# Patient Record
Sex: MALE
[De-identification: names, ages, dates, MRNs, and addresses within clinical notes are randomized per-mention and may not be internally consistent; named-entity substitution may affect disease eponyms.]

---

## 2017-10-18 ENCOUNTER — APPOINTMENT (OUTPATIENT)
Dept: ENDOCRINOLOGY | Facility: CLINIC | Age: 62
End: 2017-10-18

## 2017-11-15 ENCOUNTER — LABORATORY RESULT (OUTPATIENT)
Age: 62
End: 2017-11-15

## 2017-11-15 ENCOUNTER — APPOINTMENT (OUTPATIENT)
Dept: ENDOCRINOLOGY | Facility: CLINIC | Age: 62
End: 2017-11-15
Payer: COMMERCIAL

## 2017-11-15 DIAGNOSIS — Z78.9 OTHER SPECIFIED HEALTH STATUS: ICD-10-CM

## 2017-11-15 PROCEDURE — 99204 OFFICE O/P NEW MOD 45 MIN: CPT | Mod: 25

## 2017-11-15 PROCEDURE — 82962 GLUCOSE BLOOD TEST: CPT

## 2017-11-22 ENCOUNTER — RX RENEWAL (OUTPATIENT)
Age: 62
End: 2017-11-22

## 2017-11-22 RX ORDER — ERGOCALCIFEROL 1.25 MG/1
1.25 MG CAPSULE, LIQUID FILLED ORAL
Qty: 12 | Refills: 3 | Status: ACTIVE | COMMUNITY
Start: 2017-11-22 | End: 1900-01-01

## 2019-07-24 ENCOUNTER — LABORATORY RESULT (OUTPATIENT)
Age: 64
End: 2019-07-24

## 2019-07-24 ENCOUNTER — APPOINTMENT (OUTPATIENT)
Dept: ENDOCRINOLOGY | Facility: CLINIC | Age: 64
End: 2019-07-24
Payer: COMMERCIAL

## 2019-07-24 VITALS
HEART RATE: 70 BPM | OXYGEN SATURATION: 100 % | DIASTOLIC BLOOD PRESSURE: 88 MMHG | HEIGHT: 66 IN | SYSTOLIC BLOOD PRESSURE: 140 MMHG

## 2019-07-24 DIAGNOSIS — Z82.49 FAMILY HISTORY OF ISCHEMIC HEART DISEASE AND OTHER DISEASES OF THE CIRCULATORY SYSTEM: ICD-10-CM

## 2019-07-24 DIAGNOSIS — Z86.59 PERSONAL HISTORY OF OTHER MENTAL AND BEHAVIORAL DISORDERS: ICD-10-CM

## 2019-07-24 DIAGNOSIS — E78.5 HYPERLIPIDEMIA, UNSPECIFIED: ICD-10-CM

## 2019-07-24 DIAGNOSIS — Z83.3 FAMILY HISTORY OF DIABETES MELLITUS: ICD-10-CM

## 2019-07-24 DIAGNOSIS — Z00.00 ENCOUNTER FOR GENERAL ADULT MEDICAL EXAMINATION W/OUT ABNORMAL FINDINGS: ICD-10-CM

## 2019-07-24 DIAGNOSIS — E11.42 TYPE 2 DIABETES MELLITUS WITH DIABETIC POLYNEUROPATHY: ICD-10-CM

## 2019-07-24 DIAGNOSIS — H40.151: ICD-10-CM

## 2019-07-24 DIAGNOSIS — Z80.6 FAMILY HISTORY OF LEUKEMIA: ICD-10-CM

## 2019-07-24 DIAGNOSIS — Z86.39 PERSONAL HISTORY OF OTHER ENDOCRINE, NUTRITIONAL AND METABOLIC DISEASE: ICD-10-CM

## 2019-07-24 PROCEDURE — 99205 OFFICE O/P NEW HI 60 MIN: CPT

## 2019-07-24 RX ORDER — SEMAGLUTIDE 1.34 MG/ML
2 INJECTION, SOLUTION SUBCUTANEOUS
Qty: 4 | Refills: 8 | Status: ACTIVE | COMMUNITY
Start: 2019-07-24 | End: 1900-01-01

## 2019-07-24 RX ORDER — ATORVASTATIN CALCIUM 20 MG/1
20 TABLET, FILM COATED ORAL
Qty: 90 | Refills: 3 | Status: ACTIVE | COMMUNITY
Start: 2019-07-24 | End: 1900-01-01

## 2019-07-24 RX ORDER — METFORMIN HYDROCHLORIDE 1000 MG/1
1000 TABLET, COATED ORAL
Refills: 0 | Status: ACTIVE | COMMUNITY

## 2019-07-24 NOTE — REASON FOR VISIT
[Initial Eval - Existing Diagnosis] : an initial evaluation of an existing diagnosis [FreeTextEntry1] : Diabetes

## 2019-07-24 NOTE — ASSESSMENT
[Carbohydrate Consistent Diet] : carbohydrate consistent diet [Hypoglycemia Management] : hypoglycemia management [Diabetes Foot Care] : diabetes foot care [Importance of Diet and Exercise] : importance of diet and exercise to improve glycemic control, achieve weight loss and improve cardiovascular health [Action and use of Insulin] : action and use of short and long-acting insulin [Long Term Vascular Complications] : long term vascular complications of diabetes [Self Monitoring of Blood Glucose] : self monitoring of blood glucose [Injection Technique, Storage, Sharps Disposal] : injection technique, storage, and sharps disposal [Insulin Self-Administration] : insulin self-administration [FreeTextEntry1] : 62 year old male present for initial evaluation of existing diagnosis of uncontrolled T2 and hypercalcemia \par Mild hypercalcemia, pt asymptomatic no FM hx of hyperparathyroid \par Pt is high CV risk with hyperglycemia, obesity, HTN, and HLD \par Extensive education done with pt including importance of adhering to diet and exercise\par Advise for pt to eat small frequent meals, not to skip meals and not to give himself insulin if he is not eating \par \par #1 DM\par - reviewed long term effects of uncontrolled DM\par - instructed on BG goals fasting and 2 hours post meals\par - instructed on increasing activity to  30 min a day\par - cont with Metformin 1 gm BID, Lantus at 30 units, decrease Humalog to 10 units BID ( when Ozempic started)\par - Initiate Ozempic Q week.  reviewed side effects with pt including but not limited to GI disturbances, pancreatitis, and thyroid CA.  Pt denies any FM Hx of thyroid Cancer. \par - reviewed how to use pen and self inject/ injection techniques\par - labs ordered today \par - RTC in 2 weeks with log for review and f/u on Ozempic tolerance \par \par #2 HTN \par Cont with current meds \par increase activity \par Avoid salty food or adding salt to meals \par \par #3 HyperCalcemia \par repeat ca level \par Check PTH \par r/o hyperparathyroidism  \par \par #4 Vit D deficiency \par Check levels \par \par #5 HLD \par Initiate statin - Atorvastatin 20 mg \par Decrease fats, eat lean meats \par Increase exercise \par \par Verbalized understanding to instruction\par 2 week F/U [FreeTextEntry2] : Ozempic use and administration

## 2019-07-24 NOTE — HISTORY OF PRESENT ILLNESS
[FreeTextEntry1] : 64 year old male present for initial evaluation of existing diagnosis of T2DM\par History of DM for past ~30 years\par pt reports have never been controlled.  He does not follow diabetic diet and eat whatever he wants.  Although he states with in the last month he has been trying to eat healthier \par Per pts knowledge he has no DM complications \par PMH: HTN, HLD, Glaucoma, anxiety, hypercalcemia \par   \par Denies polydipsia, polyuria, polyphagia, blurry vision or fatigue, CP or SOB \par \par Current regimen: Lantus 30 units daily, Humalog 16 units BID (pt self doses insulin), metformin 1g BID, Amlodipine, Losartan-HCTZ, \par Pt only uses Humalog BID because he skips meals and usually does not have lunch \par \par SMBG: BID \par Range: 120-200\par Fundoscopic examination: 2 weeks ago + Glaucoma \par Podiatrist: >1 year \par exercise: sedentary \par \par \par LABS: March 2019 \par Ca 11.6\par +microalbumin 82 \par \par Tot. Chol 216\par A1C 10.0%

## 2019-07-24 NOTE — PHYSICAL EXAM
[Alert] : alert [Clear to Auscultation] : lungs were clear to auscultation bilaterally [Normal S1, S2] : normal S1 and S2 [Oriented x3] : oriented to person, place, and time

## 2019-07-24 NOTE — CONSULT LETTER
[Dear  ___] : Dear  [unfilled], [Courtesy Letter:] : I had the pleasure of seeing your patient, [unfilled], in my office today. [( Thank you for referring [unfilled] for consultation for _____ )] : Thank you for referring [unfilled] for consultation for [unfilled] [Please see my note below.] : Please see my note below. [Sincerely,] : Sincerely, [FreeTextEntry3] : Sophia Deleon, MSN, AGNP-C\par Nurse Practitioner \par [facility] \par Endocrinology [address]par

## 2019-07-29 ENCOUNTER — LABORATORY RESULT (OUTPATIENT)
Age: 64
End: 2019-07-29

## 2019-07-29 LAB
25(OH)D3 SERPL-MCNC: 15.9 NG/ML
ALBUMIN SERPL ELPH-MCNC: 5.1 G/DL
ALP BLD-CCNC: 77 U/L
ALT SERPL-CCNC: 44 U/L
ANION GAP SERPL CALC-SCNC: 18 MMOL/L
AST SERPL-CCNC: 34 U/L
BASOPHILS # BLD AUTO: 0.03 K/UL
BASOPHILS NFR BLD AUTO: 0.8 %
BILIRUB SERPL-MCNC: 0.4 MG/DL
BUN SERPL-MCNC: 15 MG/DL
CALCIUM SERPL-MCNC: 11.7 MG/DL
CALCIUM SERPL-MCNC: 11.8 MG/DL
CHLORIDE SERPL-SCNC: 108 MMOL/L
CHOLEST SERPL-MCNC: 233 MG/DL
CHOLEST/HDLC SERPL: 5 RATIO
CO2 SERPL-SCNC: 19 MMOL/L
CREAT SERPL-MCNC: 1.11 MG/DL
CREAT SPEC-SCNC: 84 MG/DL
EOSINOPHIL # BLD AUTO: 0.16 K/UL
EOSINOPHIL NFR BLD AUTO: 4.4 %
ESTIMATED AVERAGE GLUCOSE: 194 MG/DL
GLUCOSE SERPL-MCNC: 103 MG/DL
HBA1C MFR BLD HPLC: 8.4 %
HCT VFR BLD CALC: 39.9 %
HDLC SERPL-MCNC: 47 MG/DL
HGB BLD-MCNC: 12.8 G/DL
IMM GRANULOCYTES NFR BLD AUTO: 0.3 %
LDLC SERPL CALC-MCNC: 154 MG/DL
LYMPHOCYTES # BLD AUTO: 1.58 K/UL
LYMPHOCYTES NFR BLD AUTO: 43.5 %
MAN DIFF?: NORMAL
MCHC RBC-ENTMCNC: 28.4 PG
MCHC RBC-ENTMCNC: 32.1 GM/DL
MCV RBC AUTO: 88.5 FL
MICROALBUMIN 24H UR DL<=1MG/L-MCNC: 5 MG/DL
MICROALBUMIN/CREAT 24H UR-RTO: 60 MG/G
MONOCYTES # BLD AUTO: 0.35 K/UL
MONOCYTES NFR BLD AUTO: 9.6 %
NEUTROPHILS # BLD AUTO: 1.5 K/UL
NEUTROPHILS NFR BLD AUTO: 41.4 %
PARATHYROID HORMONE INTACT: 136 PG/ML
PLATELET # BLD AUTO: 123 K/UL
POTASSIUM SERPL-SCNC: 4.5 MMOL/L
PROT SERPL-MCNC: 7.5 G/DL
RBC # BLD: 4.51 M/UL
RBC # FLD: 14 %
SODIUM SERPL-SCNC: 145 MMOL/L
TRIGL SERPL-MCNC: 158 MG/DL
VIT B12 SERPL-MCNC: 197 PG/ML
WBC # FLD AUTO: 3.63 K/UL

## 2019-07-29 RX ORDER — ERGOCALCIFEROL 1.25 MG/1
1.25 MG CAPSULE, LIQUID FILLED ORAL
Qty: 12 | Refills: 2 | Status: ACTIVE | COMMUNITY
Start: 2019-07-29 | End: 1900-01-01

## 2019-07-29 RX ORDER — LOSARTAN POTASSIUM AND HYDROCHLOROTHIAZIDE 25; 100 MG/1; MG/1
100-25 TABLET ORAL DAILY
Qty: 90 | Refills: 2 | Status: DISCONTINUED | COMMUNITY
Start: 2019-07-24 | End: 2019-07-29

## 2019-07-29 RX ORDER — VITAMIN B COMPLEX
2500 TABLET ORAL
Qty: 30 | Refills: 11 | Status: ACTIVE | COMMUNITY
Start: 2019-07-29 | End: 1900-01-01

## 2019-07-29 RX ORDER — LEVOTHYROXINE SODIUM 0.03 MG/1
25 TABLET ORAL
Qty: 30 | Refills: 5 | Status: DISCONTINUED | COMMUNITY
Start: 2019-07-29 | End: 2019-07-29

## 2019-07-31 LAB
ALBUMIN MFR SERPL ELPH: 65.9 %
ALBUMIN SERPL-MCNC: 4.9 G/DL
ALBUMIN/GLOB SERPL: 1.9 RATIO
ALP BONE SERPL-MCNC: 14 MCG/L
ALPHA1 GLOB MFR SERPL ELPH: 3.2 %
ALPHA1 GLOB SERPL ELPH-MCNC: 0.2 G/DL
ALPHA2 GLOB MFR SERPL ELPH: 6.6 %
ALPHA2 GLOB SERPL ELPH-MCNC: 0.5 G/DL
B-GLOBULIN MFR SERPL ELPH: 11.3 %
B-GLOBULIN SERPL ELPH-MCNC: 0.8 G/DL
GAMMA GLOB FLD ELPH-MCNC: 1 G/DL
GAMMA GLOB MFR SERPL ELPH: 13 %
INTERPRETATION SERPL IEP-IMP: NORMAL
PROT SERPL-MCNC: 7.5 G/DL

## 2019-08-07 ENCOUNTER — APPOINTMENT (OUTPATIENT)
Dept: ENDOCRINOLOGY | Facility: CLINIC | Age: 64
End: 2019-08-07
Payer: COMMERCIAL

## 2019-08-07 VITALS
SYSTOLIC BLOOD PRESSURE: 151 MMHG | HEART RATE: 90 BPM | HEIGHT: 69 IN | DIASTOLIC BLOOD PRESSURE: 73 MMHG | WEIGHT: 205 LBS | BODY MASS INDEX: 30.36 KG/M2

## 2019-08-07 PROCEDURE — 99215 OFFICE O/P EST HI 40 MIN: CPT

## 2019-08-07 RX ORDER — AMLODIPINE BESYLATE 10 MG/1
10 TABLET ORAL DAILY
Qty: 90 | Refills: 3 | Status: ACTIVE | COMMUNITY
Start: 1900-01-01 | End: 1900-01-01

## 2019-08-07 RX ORDER — BLOOD-GLUCOSE CONTROL, NORMAL
NORMAL EACH MISCELLANEOUS
Qty: 1 | Refills: 11 | Status: ACTIVE | COMMUNITY
Start: 2019-08-07 | End: 1900-01-01

## 2019-08-07 RX ORDER — BLOOD-GLUCOSE CONTROL, LOW
LOW EACH MISCELLANEOUS
Qty: 1 | Refills: 11 | Status: ACTIVE | COMMUNITY
Start: 2019-08-07 | End: 1900-01-01

## 2019-08-07 NOTE — ASSESSMENT
[Carbohydrate Consistent Diet] : carbohydrate consistent diet [Hypoglycemia Management] : hypoglycemia management [Long Term Vascular Complications] : long term vascular complications of diabetes [FreeTextEntry1] : 63 yo male with uncontrolled T2DM and new dx of primary hyperparathyroidism \par \par -DM\par Doing well with Ozempic - tolerating \par BG slowing trending down \par Cont with current regime \par encourage to increase exercise \par \par - hyperparathyroid \par Discussed tx options with pt and recommended surgery based on CA levels and PTH levels asymptomatic except for occasionally "tachycardia" \par Pt opted for medical management at this time, needs time to think about surgery \par Will submit PA for Vit D and Vit b12 \par repeat CA level \par including 24 hrs urine and urine electrophoresis \par PTH related peptide \par \par \par -HTN\par Increased amlodipine to 10 mg QD \par Cont with Losartan 50 mg\par D/C HTCZ due to hypercalcemia

## 2019-08-07 NOTE — PHYSICAL EXAM
[Alert] : alert [No Accessory Muscle Use] : no accessory muscle use [Normal Rate] : heart rate was normal  [Clear to Auscultation] : lungs were clear to auscultation bilaterally [Normal Gait] : normal gait [Normal S1, S2] : normal S1 and S2 [Oriented x3] : oriented to person, place, and time

## 2019-08-07 NOTE — HISTORY OF PRESENT ILLNESS
[FreeTextEntry1] : 65 yo male with uncontrolled DM and primary hyperparathyroid present today for f/u and discussion of hyperthyroid tx plan \par \par Pt is tolerating ozempic well with no adverse effects \par BG has drop <200 although still elevated \par \par Reviewed lab, tx for HPT he opted for no surgery at this point and medical management \par He has stopped his HCTZ as directed \par Unable to start Vit D and B12 due to failure of insurance coverage\par He is feeling well although he periodically feels palpitations \par Denies forgetfulness, GI disturbances, bone pain/ fx, hx of kidney stones

## 2019-08-14 ENCOUNTER — OTHER (OUTPATIENT)
Age: 64
End: 2019-08-14

## 2019-08-14 LAB
ALBUPE: 43.5 %
ALPHA1UPE: 24.5 %
ALPHA2UPE: 14.8 %
BETAUPE: 10.2 %
CALCIT SERPL-MCNC: 16.8 PG/ML
CALCIUM ?TM UR-MCNC: 9.8 MG/DL
CALCIUM SERPL-MCNC: 12.2 MG/DL
CALCIUM/CREAT UR: 0.1 RATIO
CREAT SPEC-SCNC: 98 MG/DL
GAMMAUPE: 7 %
IGA 24H UR QL IFE: NORMAL
KAPPA LC 24H UR QL: NORMAL
PHOSPHATE SERPL-MCNC: 2.4 MG/DL
PROT PATTERN 24H UR ELPH-IMP: NORMAL
PROT UR-MCNC: 10 MG/DL
PROT UR-MCNC: 10 MG/DL
PTH RELATED PROT SERPL-MCNC: <2 PMOL/L

## 2019-09-04 ENCOUNTER — APPOINTMENT (OUTPATIENT)
Dept: ENDOCRINOLOGY | Facility: CLINIC | Age: 64
End: 2019-09-04
Payer: COMMERCIAL

## 2019-09-04 VITALS
BODY MASS INDEX: 32.58 KG/M2 | DIASTOLIC BLOOD PRESSURE: 81 MMHG | HEIGHT: 69 IN | WEIGHT: 220 LBS | HEART RATE: 89 BPM | SYSTOLIC BLOOD PRESSURE: 143 MMHG | OXYGEN SATURATION: 99 %

## 2019-09-04 PROCEDURE — 99215 OFFICE O/P EST HI 40 MIN: CPT

## 2019-09-04 RX ORDER — INSULIN LISPRO 100 [IU]/ML
100 INJECTION, SOLUTION INTRAVENOUS; SUBCUTANEOUS
Refills: 0 | Status: ACTIVE | COMMUNITY

## 2019-09-04 RX ORDER — INSULIN GLARGINE 100 [IU]/ML
100 INJECTION, SOLUTION SUBCUTANEOUS
Refills: 0 | Status: ACTIVE | COMMUNITY

## 2019-09-04 NOTE — ASSESSMENT
[FreeTextEntry1] : 65 yo male with uncontrolled T2DM and new dx of primary hyperparathyroidism \par \par -DM\par Doing well with Ozempic - tolerating with minimal side effects (diarrhea)\par Self D/C Lantus advised to resume  \par Cont with Humalog 16 ac and Lantus 30 units \par encourage to increase exercise \par Reviewed medications and importance of diet and exercise \par \par - hyperparathyroid \par Pt asymptomatic, still declines surgery \par Pt opted for medical management at this time\par Referred for renal US r/o Kidney stones \par further education provided\par \par -HTN\par Increased amlodipine to 10 mg QD \par Cont with Losartan 50 mg\par D/C HTCZ due to hypercalcemia [Action and use of Insulin] : action and use of short and long-acting insulin [Importance of Diet and Exercise] : importance of diet and exercise to improve glycemic control, achieve weight loss and improve cardiovascular health

## 2019-09-04 NOTE — HISTORY OF PRESENT ILLNESS
[FreeTextEntry1] : 63 yo male with uncontrolled DM and primary hyperparathyroid present today for f/u\par \par Pt is tolerating ozempic well with minimal side effects has had some diarrhea but he is tolerating.  Has been able to increase dose to 0.5mg.    \par Pt self D/C'd Lantus. with some BG > 200 \par Discussed stopping Humalog once able to fully tolerate Ozempic   \par He is feeling well with no complaints\par Denies forgetfulness, GI disturbances, bone pain/ fx, hx of kidney stones\par Discussed having US done to R/O stones due to hyperparathyroid\par he is compliant with avoid Ca++ intake

## 2019-09-30 ENCOUNTER — OTHER (OUTPATIENT)
Age: 64
End: 2019-09-30

## 2019-10-02 ENCOUNTER — APPOINTMENT (OUTPATIENT)
Dept: ENDOCRINOLOGY | Facility: CLINIC | Age: 64
End: 2019-10-02

## 2019-10-03 ENCOUNTER — OTHER (OUTPATIENT)
Age: 64
End: 2019-10-03

## 2019-10-03 RX ORDER — SEMAGLUTIDE 1.34 MG/ML
2 INJECTION, SOLUTION SUBCUTANEOUS
Qty: 2 | Refills: 11 | Status: ACTIVE | OUTPATIENT
Start: 2019-10-03

## 2019-10-16 ENCOUNTER — APPOINTMENT (OUTPATIENT)
Dept: ENDOCRINOLOGY | Facility: CLINIC | Age: 64
End: 2019-10-16
Payer: COMMERCIAL

## 2019-10-16 VITALS
HEART RATE: 92 BPM | WEIGHT: 215 LBS | SYSTOLIC BLOOD PRESSURE: 149 MMHG | DIASTOLIC BLOOD PRESSURE: 78 MMHG | HEIGHT: 69 IN | OXYGEN SATURATION: 99 % | BODY MASS INDEX: 31.84 KG/M2

## 2019-10-16 DIAGNOSIS — E11.65 TYPE 2 DIABETES MELLITUS WITH HYPERGLYCEMIA: ICD-10-CM

## 2019-10-16 DIAGNOSIS — E21.0 PRIMARY HYPERPARATHYROIDISM: ICD-10-CM

## 2019-10-16 DIAGNOSIS — E55.9 VITAMIN D DEFICIENCY, UNSPECIFIED: ICD-10-CM

## 2019-10-16 DIAGNOSIS — E53.9 VITAMIN B DEFICIENCY, UNSPECIFIED: ICD-10-CM

## 2019-10-16 DIAGNOSIS — I10 ESSENTIAL (PRIMARY) HYPERTENSION: ICD-10-CM

## 2019-10-16 PROCEDURE — 99215 OFFICE O/P EST HI 40 MIN: CPT

## 2019-10-16 RX ORDER — LOSARTAN POTASSIUM 100 MG/1
100 TABLET, FILM COATED ORAL DAILY
Qty: 90 | Refills: 3 | Status: ACTIVE | COMMUNITY
Start: 2019-07-29 | End: 1900-01-01

## 2019-10-17 PROBLEM — E55.9 VITAMIN D DEFICIENCY: Status: ACTIVE | Noted: 2017-11-22

## 2019-10-17 PROBLEM — E53.9 VITAMIN B DEFICIENCY, UNSPECIFIED: Status: ACTIVE | Noted: 2019-07-29

## 2019-10-17 LAB
25(OH)D3 SERPL-MCNC: 30.4 NG/ML
ALBUMIN SERPL ELPH-MCNC: 5.1 G/DL
ALP BLD-CCNC: 74 U/L
ALT SERPL-CCNC: 28 U/L
ANION GAP SERPL CALC-SCNC: 16 MMOL/L
AST SERPL-CCNC: 21 U/L
BILIRUB SERPL-MCNC: 0.8 MG/DL
BUN SERPL-MCNC: 13 MG/DL
CALCIUM SERPL-MCNC: 11.9 MG/DL
CALCIUM SERPL-MCNC: 11.9 MG/DL
CHLORIDE SERPL-SCNC: 107 MMOL/L
CHOLEST SERPL-MCNC: 140 MG/DL
CHOLEST/HDLC SERPL: 3 RATIO
CO2 SERPL-SCNC: 20 MMOL/L
CREAT SERPL-MCNC: 1.15 MG/DL
CREAT SPEC-SCNC: 148 MG/DL
ESTIMATED AVERAGE GLUCOSE: 146 MG/DL
GLUCOSE SERPL-MCNC: 62 MG/DL
HBA1C MFR BLD HPLC: 6.7 %
HDLC SERPL-MCNC: 46 MG/DL
LDLC SERPL CALC-MCNC: 73 MG/DL
MICROALBUMIN 24H UR DL<=1MG/L-MCNC: 9.8 MG/DL
MICROALBUMIN/CREAT 24H UR-RTO: 66 MG/G
PARATHYROID HORMONE INTACT: 124 PG/ML
PHOSPHATE SERPL-MCNC: 3 MG/DL
POTASSIUM SERPL-SCNC: 4.1 MMOL/L
PROT SERPL-MCNC: 7.5 G/DL
SODIUM SERPL-SCNC: 142 MMOL/L
TRIGL SERPL-MCNC: 104 MG/DL
VIT B12 SERPL-MCNC: 290 PG/ML

## 2019-10-17 NOTE — REVIEW OF SYSTEMS
[As Noted in HPI] : as noted in HPI [Vomiting] : vomiting was observed [Diarrhea] : diarrhea [Negative] : Respiratory

## 2019-10-17 NOTE — HISTORY OF PRESENT ILLNESS
[FreeTextEntry1] : 63 yo male with uncontrolled DM and primary hyperparathyroid present today for f/u\par \par Pt is tolerating ozempic well with minimal side effects has had some diarrhea but it has improved.  Has been able to increase dose to 0.5mg.    \par He is feeling well with no complaints. Although report having an episode where he passed out approximately 3 weeks ago and he vomited.  He did not check his BG when this happened.  He did not sustain any injury report this came on all of a sudden, unclear as to the cause. \par Denies forgetfulness, GI disturbances, bone pain/ fx, hx of kidney stones\par He has not gotten his renal US done \par He is compliant with avoid Ca++ intake

## 2019-10-17 NOTE — ASSESSMENT
[Long Term Vascular Complications] : long term vascular complications of diabetes [Self Monitoring of Blood Glucose] : self monitoring of blood glucose [Action and use of Insulin] : action and use of short and long-acting insulin [Importance of Diet and Exercise] : importance of diet and exercise to improve glycemic control, achieve weight loss and improve cardiovascular health [Insulin Self-Administration] : insulin self-administration [FreeTextEntry1] : 63 yo male with uncontrolled T2DM and  primary hyperparathyroidism \par \par -DM\par Doing well with Ozempic \par Cont with Humalog 16 ac and Lantus 30 units \par encourage to start exercise \par Reviewed medications and importance of diet and exercise \par Labs today \par \par - hyperparathyroid \par Pt asymptomatic, still declines surgery \par Pt opted for medical management at this time\par Needs to f/u with renal US r/o Kidney stones \par \par -HTN\par Cont. amlodipine to 10 mg QD \par Increase Losartan 100 mg \par Encouraged pt to start exercising for 30 min a day at least 3 times a week. \par \par Vit D and Vit B\par Will repeat labs \par

## 2019-10-17 NOTE — PHYSICAL EXAM
[No Respiratory Distress] : no respiratory distress [Clear to Auscultation] : lungs were clear to auscultation bilaterally [Alert] : alert [Normal S1, S2] : normal S1 and S2 [Oriented x3] : oriented to person, place, and time

## 2021-10-06 PROBLEM — I10 ESSENTIAL HYPERTENSION: Status: ACTIVE | Noted: 2017-11-15
